# Patient Record
Sex: MALE | Race: ASIAN | NOT HISPANIC OR LATINO | URBAN - METROPOLITAN AREA
[De-identification: names, ages, dates, MRNs, and addresses within clinical notes are randomized per-mention and may not be internally consistent; named-entity substitution may affect disease eponyms.]

---

## 2021-12-31 ENCOUNTER — EMERGENCY (EMERGENCY)
Facility: HOSPITAL | Age: 31
LOS: 1 days | Discharge: HOME | End: 2021-12-31
Admitting: EMERGENCY MEDICINE
Payer: COMMERCIAL

## 2021-12-31 VITALS
DIASTOLIC BLOOD PRESSURE: 56 MMHG | RESPIRATION RATE: 18 BRPM | OXYGEN SATURATION: 95 % | WEIGHT: 145.06 LBS | TEMPERATURE: 99 F | HEART RATE: 110 BPM | SYSTOLIC BLOOD PRESSURE: 108 MMHG

## 2021-12-31 DIAGNOSIS — R11.2 NAUSEA WITH VOMITING, UNSPECIFIED: ICD-10-CM

## 2021-12-31 DIAGNOSIS — R41.82 ALTERED MENTAL STATUS, UNSPECIFIED: ICD-10-CM

## 2021-12-31 DIAGNOSIS — F10.129 ALCOHOL ABUSE WITH INTOXICATION, UNSPECIFIED: ICD-10-CM

## 2021-12-31 PROCEDURE — 99284 EMERGENCY DEPT VISIT MOD MDM: CPT

## 2021-12-31 NOTE — ED PROVIDER NOTE - OBJECTIVE STATEMENT
30 yo M with no known PMHx, BIBA for AMS and public intoxication.  Admits to having heavy EtOH intake today.  Denies drug use or other illicits. No acute medical complaints or apparent trauma noted.  Minimally cooperative with hx and ROS due to heavy intox. No bleeding, respiratory distress, pain, vomiting, or urinary/bowel incontinence noted.

## 2021-12-31 NOTE — ED PROVIDER NOTE - PHYSICAL EXAMINATION
Gen - WDWN M, +AOB, lethargic but arousable by verbal stimuli  Skin - warm, dry, intact  HEENT - AT/NC, PERRL, mild conjunctival injection, pupils 3mm b/l, TM intact with no hemotympanium b/l, no facial contusion or periorbital ecchymosis, o/p clear, uvula midline, airway patent, neck supple with no step off or midline tenderness, FROM   CV - S1S2, R/R/R  Resp - respiration non-labored, CTAB, symmetric bs b/l, no r/r/w  GI - NABS, soft, ND, NT, no rebound or guarding, no CVAT b/l  MS - moving all extremities, no c/c/e   Neuro - Lethargic but arousable by verbal stimuli, gargle speech and unsteady gait

## 2021-12-31 NOTE — ED PROVIDER NOTE - CLINICAL SUMMARY MEDICAL DECISION MAKING FREE TEXT BOX
Deleted charges for hst as not valid test    pt here for public intox, euglycemic, monitored in the ED with improved mental status, AFVSS, currently ambulatory with steady gait, tolerating PO without N/V, clear speech, without additional medical complaints noted.  Repeat exam and neuro/cranial nerve exams wnl.  No evidence of head/neck trauma. Pt feels much better and safe for discharge. Counseling provided. Medically stable for d/c.

## 2021-12-31 NOTE — ED PROVIDER NOTE - CARE PLAN
1 Principal Discharge DX:	Altered mental status  Secondary Diagnosis:	Nausea and vomiting  Secondary Diagnosis:	Alcohol intoxication

## 2021-12-31 NOTE — ED ADULT NURSE NOTE - OBJECTIVE STATEMENT
Pt BIBA for AMS, admits to ETOH abuse. Pt is responsive to verbal stimuli. Airway is patent and breathing is spontaneous and unlabored. No visible injures or traumas noted. Pt placed in stretcher with bed alarm, stretcher in the lowest position, in view of nurses station. Will monitor pt closely

## 2021-12-31 NOTE — ED ADULT NURSE NOTE - NSIMPLEMENTINTERV_GEN_ALL_ED
Implemented All Fall Risk Interventions:  [place] to call system. Call bell, personal items and telephone within reach. Instruct patient to call for assistance. Room bathroom lighting operational. Non-slip footwear when patient is off stretcher. Physically safe environment: no spills, clutter or unnecessary equipment. Stretcher in lowest position, wheels locked, appropriate side rails in place. Provide visual cue, wrist band, yellow gown, etc. Monitor gait and stability. Monitor for mental status changes and reorient to person, place, and time. Review medications for side effects contributing to fall risk. Reinforce activity limits and safety measures with patient and family.

## 2021-12-31 NOTE — ED PROVIDER NOTE - PATIENT PORTAL LINK FT
You can access the FollowMyHealth Patient Portal offered by [facility] by registering at the following website: [email or web address]. By joining [facility] FollowMyHealth portal, you will also be able to view your health information using other applications (apps) compatible with our system.

## 2022-01-01 VITALS — HEART RATE: 112 BPM

## 2024-08-14 NOTE — ED ADULT NURSE NOTE - CHIEF COMPLAINT
Pt into the Ed via EMS for a headache 8/10 . Pt states his headache as been going on for weeks but could not take the pain any longer. Upon arrival EMS noted pts oxygen at 82% and put him on 6 liters via NC. In ED pt was 87% on RA. Pt is now 95% on 3 L NC. Pt has a hx of COPD and is on 3 L O2 at all times. Alert x4.    The patient is a 31y Male complaining of altered mental status.